# Patient Record
Sex: FEMALE | Race: WHITE | NOT HISPANIC OR LATINO | Employment: FULL TIME | URBAN - METROPOLITAN AREA
[De-identification: names, ages, dates, MRNs, and addresses within clinical notes are randomized per-mention and may not be internally consistent; named-entity substitution may affect disease eponyms.]

---

## 2017-12-13 ENCOUNTER — HISTORICAL (OUTPATIENT)
Dept: RADIOLOGY | Facility: HOSPITAL | Age: 62
End: 2017-12-13

## 2018-03-12 ENCOUNTER — HISTORICAL (OUTPATIENT)
Dept: RADIOLOGY | Facility: HOSPITAL | Age: 63
End: 2018-03-12

## 2018-10-03 ENCOUNTER — HISTORICAL (OUTPATIENT)
Dept: RADIOLOGY | Facility: HOSPITAL | Age: 63
End: 2018-10-03

## 2018-10-10 LAB
HIGH RISK HPV 16 (PRECISION): NEGATIVE
HIGH RISK HPV 18/45 (PRECISION): NEGATIVE
HUMAN PAPILLOMAVIRUS (HPV): NORMAL
PAP RECOMMENDATION EXT: NORMAL
PAP SMEAR: NORMAL

## 2019-05-08 ENCOUNTER — HISTORICAL (OUTPATIENT)
Dept: RADIOLOGY | Facility: HOSPITAL | Age: 64
End: 2019-05-08

## 2019-12-09 ENCOUNTER — HISTORICAL (OUTPATIENT)
Dept: RADIOLOGY | Facility: HOSPITAL | Age: 64
End: 2019-12-09

## 2020-06-22 ENCOUNTER — HISTORICAL (OUTPATIENT)
Dept: RADIOLOGY | Facility: HOSPITAL | Age: 65
End: 2020-06-22

## 2020-12-28 ENCOUNTER — HISTORICAL (OUTPATIENT)
Dept: RADIOLOGY | Facility: HOSPITAL | Age: 65
End: 2020-12-28

## 2021-07-14 LAB — BCS RECOMMENDATION EXT: NORMAL

## 2021-12-29 ENCOUNTER — HISTORICAL (OUTPATIENT)
Dept: RADIOLOGY | Facility: HOSPITAL | Age: 66
End: 2021-12-29

## 2022-03-03 LAB — BCS RECOMMENDATION EXT: NORMAL

## 2022-04-09 ENCOUNTER — HISTORICAL (OUTPATIENT)
Dept: ADMINISTRATIVE | Facility: HOSPITAL | Age: 67
End: 2022-04-09
Payer: COMMERCIAL

## 2022-04-25 VITALS
BODY MASS INDEX: 28.92 KG/M2 | SYSTOLIC BLOOD PRESSURE: 154 MMHG | DIASTOLIC BLOOD PRESSURE: 80 MMHG | HEIGHT: 70 IN | OXYGEN SATURATION: 97 % | WEIGHT: 202 LBS

## 2022-04-30 NOTE — PROGRESS NOTES
Patient:   Tracy Robledo            MRN: [id number]            FIN: [id number]               Age:   65 years     Sex:  Female     :  1955   Associated Diagnoses:   None   Author:   Devon Myers MD A      Pt with history of carcinoid tumor s/p resection in 2018. CT scan done today for follow-up/surveillance. Scan shows no evidence of recurrence. Will repeat CT scan in 1yr and ewill follow up with me then. Discussed with patient via phone.

## 2022-05-03 ENCOUNTER — TELEPHONE (OUTPATIENT)
Dept: HEMATOLOGY/ONCOLOGY | Facility: CLINIC | Age: 67
End: 2022-05-03
Payer: COMMERCIAL

## 2022-05-03 PROBLEM — H26.9 CATARACT OF BOTH EYES: Status: RESOLVED | Noted: 2022-05-03 | Resolved: 2022-05-03

## 2022-05-03 PROBLEM — D3A.00 CARCINOID TUMOR: Status: RESOLVED | Noted: 2022-05-03 | Resolved: 2022-05-03

## 2022-05-03 PROBLEM — D05.11 DUCTAL CARCINOMA IN SITU (DCIS) OF RIGHT BREAST: Status: ACTIVE | Noted: 2022-03-31

## 2022-05-03 PROBLEM — I10 HYPERTENSION: Status: ACTIVE | Noted: 2022-05-03

## 2022-05-03 PROBLEM — D3A.00 CARCINOID TUMOR: Status: ACTIVE | Noted: 2022-05-03

## 2022-05-03 PROBLEM — N13.30 HYDRONEPHROSIS: Status: ACTIVE | Noted: 2022-05-03

## 2022-05-03 PROBLEM — K21.9 GASTROESOPHAGEAL REFLUX DISEASE: Status: ACTIVE | Noted: 2022-05-03

## 2022-05-03 PROBLEM — H26.9 CATARACT OF BOTH EYES: Status: ACTIVE | Noted: 2022-05-03

## 2022-05-03 PROBLEM — N13.30 HYDRONEPHROSIS: Status: RESOLVED | Noted: 2022-05-03 | Resolved: 2022-05-03

## 2022-05-03 PROBLEM — I25.10 ARTERIOSCLEROSIS OF CORONARY ARTERY: Status: ACTIVE | Noted: 2022-05-03

## 2022-05-04 ENCOUNTER — OFFICE VISIT (OUTPATIENT)
Dept: HEMATOLOGY/ONCOLOGY | Facility: CLINIC | Age: 67
End: 2022-05-04
Payer: COMMERCIAL

## 2022-05-04 VITALS
HEIGHT: 68 IN | SYSTOLIC BLOOD PRESSURE: 151 MMHG | WEIGHT: 198.88 LBS | TEMPERATURE: 97 F | BODY MASS INDEX: 30.14 KG/M2 | HEART RATE: 72 BPM | DIASTOLIC BLOOD PRESSURE: 78 MMHG | OXYGEN SATURATION: 98 %

## 2022-05-04 DIAGNOSIS — D05.11 DUCTAL CARCINOMA IN SITU (DCIS) OF RIGHT BREAST: Primary | ICD-10-CM

## 2022-05-04 PROCEDURE — 99999 PR PBB SHADOW E&M-EST. PATIENT-LVL III: ICD-10-PCS | Mod: PBBFAC,,, | Performed by: INTERNAL MEDICINE

## 2022-05-04 PROCEDURE — 3077F PR MOST RECENT SYSTOLIC BLOOD PRESSURE >= 140 MM HG: ICD-10-PCS | Mod: CPTII,S$GLB,, | Performed by: INTERNAL MEDICINE

## 2022-05-04 PROCEDURE — 1101F PR PT FALLS ASSESS DOC 0-1 FALLS W/OUT INJ PAST YR: ICD-10-PCS | Mod: CPTII,S$GLB,, | Performed by: INTERNAL MEDICINE

## 2022-05-04 PROCEDURE — 1159F PR MEDICATION LIST DOCUMENTED IN MEDICAL RECORD: ICD-10-PCS | Mod: CPTII,S$GLB,, | Performed by: INTERNAL MEDICINE

## 2022-05-04 PROCEDURE — 1101F PT FALLS ASSESS-DOCD LE1/YR: CPT | Mod: CPTII,S$GLB,, | Performed by: INTERNAL MEDICINE

## 2022-05-04 PROCEDURE — 99204 OFFICE O/P NEW MOD 45 MIN: CPT | Mod: S$GLB,,, | Performed by: INTERNAL MEDICINE

## 2022-05-04 PROCEDURE — 3078F DIAST BP <80 MM HG: CPT | Mod: CPTII,S$GLB,, | Performed by: INTERNAL MEDICINE

## 2022-05-04 PROCEDURE — 4010F ACE/ARB THERAPY RXD/TAKEN: CPT | Mod: CPTII,S$GLB,, | Performed by: INTERNAL MEDICINE

## 2022-05-04 PROCEDURE — 3078F PR MOST RECENT DIASTOLIC BLOOD PRESSURE < 80 MM HG: ICD-10-PCS | Mod: CPTII,S$GLB,, | Performed by: INTERNAL MEDICINE

## 2022-05-04 PROCEDURE — 3077F SYST BP >= 140 MM HG: CPT | Mod: CPTII,S$GLB,, | Performed by: INTERNAL MEDICINE

## 2022-05-04 PROCEDURE — 3288F FALL RISK ASSESSMENT DOCD: CPT | Mod: CPTII,S$GLB,, | Performed by: INTERNAL MEDICINE

## 2022-05-04 PROCEDURE — 3288F PR FALLS RISK ASSESSMENT DOCUMENTED: ICD-10-PCS | Mod: CPTII,S$GLB,, | Performed by: INTERNAL MEDICINE

## 2022-05-04 PROCEDURE — 1160F PR REVIEW ALL MEDS BY PRESCRIBER/CLIN PHARMACIST DOCUMENTED: ICD-10-PCS | Mod: CPTII,S$GLB,, | Performed by: INTERNAL MEDICINE

## 2022-05-04 PROCEDURE — 4010F PR ACE/ARB THEARPY RXD/TAKEN: ICD-10-PCS | Mod: CPTII,S$GLB,, | Performed by: INTERNAL MEDICINE

## 2022-05-04 PROCEDURE — 1160F RVW MEDS BY RX/DR IN RCRD: CPT | Mod: CPTII,S$GLB,, | Performed by: INTERNAL MEDICINE

## 2022-05-04 PROCEDURE — 99999 PR PBB SHADOW E&M-EST. PATIENT-LVL III: CPT | Mod: PBBFAC,,, | Performed by: INTERNAL MEDICINE

## 2022-05-04 PROCEDURE — 3008F BODY MASS INDEX DOCD: CPT | Mod: CPTII,S$GLB,, | Performed by: INTERNAL MEDICINE

## 2022-05-04 PROCEDURE — 1159F MED LIST DOCD IN RCRD: CPT | Mod: CPTII,S$GLB,, | Performed by: INTERNAL MEDICINE

## 2022-05-04 PROCEDURE — 99204 PR OFFICE/OUTPT VISIT, NEW, LEVL IV, 45-59 MIN: ICD-10-PCS | Mod: S$GLB,,, | Performed by: INTERNAL MEDICINE

## 2022-05-04 PROCEDURE — 3008F PR BODY MASS INDEX (BMI) DOCUMENTED: ICD-10-PCS | Mod: CPTII,S$GLB,, | Performed by: INTERNAL MEDICINE

## 2022-05-04 RX ORDER — AMOXICILLIN 500 MG
CAPSULE ORAL DAILY
COMMUNITY

## 2022-05-04 RX ORDER — ASPIRIN 81 MG/1
TABLET ORAL
COMMUNITY

## 2022-05-04 RX ORDER — ROSUVASTATIN CALCIUM 5 MG/1
5 TABLET, COATED ORAL
COMMUNITY

## 2022-05-04 RX ORDER — ACETAMINOPHEN 500 MG
TABLET ORAL
COMMUNITY
End: 2022-07-13

## 2022-05-04 RX ORDER — METOPROLOL SUCCINATE 100 MG/1
100 TABLET, EXTENDED RELEASE ORAL DAILY
COMMUNITY

## 2022-05-04 RX ORDER — OMEPRAZOLE 40 MG/1
40 CAPSULE, DELAYED RELEASE ORAL
COMMUNITY

## 2022-05-04 NOTE — PROGRESS NOTES
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 There was a 1.4 cm mass in the right breast at the 3 o'clock position 3 cm from the nipple.  She underwent a biopsy.  Which showed intraductal papilloma with atypia.  She was then seen and underwent evaluation by Dr. Mohinder Santillan.  She had a breast MRI on 03/03/2022.  Which showed fibroglandular changes.  No evidence of active malignancy.  But due to her atypia.  The patient underwent an excisional biopsy on 03/31/2022.    The final report of that breast excisional biopsy on 04/06/2022 showed a low-grade ductal carcinoma in Situ with focal papillary micro papillary features with postinflammatory change.  Lesion was noted to be within several mm of the skeletal muscle tissue consistent with a deep location within the breast.        05/04/2022:  New patient visit.    05/04/2022  Past Medical History:   Diagnosis Date    Carcinoid tumor 10/29/2018    Cataract of both eyes 05/03/2022    Hydronephrosis 05/03/2022        Current Outpatient Medications:     aspirin (ECOTRIN) 81 MG EC tablet, Aspirin Low Dose Take No date recorded No form recorded No frequency recorded No route recorded No set duration recorded No set duration amount recorded active No dosage strength recorded No dosage strength units of measure recorded, Disp: , Rfl:     CALCIUM ORAL, Take by mouth., Disp: , Rfl:     cholecalciferol, vitamin D3, (VITAMIN D3) 50 mcg (2,000 unit) Cap capsule, Take by mouth., Disp: , Rfl:     metoprolol succinate (TOPROL-XL) 100 MG 24 hr tablet, Take 100 mg by mouth once daily., Disp: , Rfl:     montelukast sodium (SINGULAIR ORAL), Singulair Take No date recorded No  form recorded No frequency recorded No route recorded No set duration recorded No set duration amount recorded active No dosage strength recorded No dosage strength units of measure recorded, Disp: , Rfl:     olmesartan medoxomil (BENICAR ORAL), Benicar Take No date recorded No form recorded No frequency recorded No route recorded No set duration recorded No set duration amount recorded active No dosage strength recorded No dosage strength units of measure recorded, Disp: , Rfl:     omega-3 fatty acids/fish oil (FISH OIL-OMEGA-3 FATTY ACIDS) 300-1,000 mg capsule, Take by mouth once daily., Disp: , Rfl:     omeprazole (PRILOSEC) 40 MG capsule, Take 40 mg by mouth., Disp: , Rfl:     rosuvastatin (CRESTOR) 5 MG tablet, Take 5 mg by mouth., Disp: , Rfl:     ZINC ACETATE ORAL, Take by mouth., Disp: , Rfl:      Review of Systems   Review of systems: Constitutional:  [No fever, fatigue or weight loss.  No chills, no night sweats.  No weakness]    Eyes:  [No recent vision changes,]    ENT:  [No congestion, ear pain, or sore throat. no ringing in ears. No Dry mouth. no epistaxis .  No dental problems.  No epistaxis]    Endocrine:  [No thyroid problems. ]    Cardiovascular:  [No chest pain, Palpitations,  PND, orthopnea.  Edema]    Respiratory:  [No cough, shortness of breath, congestion, or wheezing.  No hemoptysis]    Gastrointestinal:  [No abdominal pain, nausea, vomiting, or diarrhea. No jaundice  NO Black tarry stool].  No heartburn.  No anorexia.    Genitourinary: [No dysuria.  No frequency.]    Musculoskeletal:  [No joint swelling.  No joint pain.]    Neurologic:  [No seizures.  Positive carpal tunnel No Headaches. No loss of balance. no new weakness.  No headaches.  No tremors]   Skin:  [No rash.  No new skin lesions. no excess dryness]    Hematologic:  [No unusual bruising or bleeding.]    Psychiatric:  [No psychiatric problems, hallucinations or depression.]    [All other systems reviewed and otherwise  negative.]          Objective:     Vitals:    05/04/22 1333   BP: (!) 151/78   Pulse: 72   Temp: 97.4 °F (36.3 °C)         Physical Exam    Normal Physical exam:  VITAL SIGNS:  Reviewed.  [  ]  GENERAL: [Well-developed well-nourished in no apparent cardiorespiratory distress]    HEAD: Normocephalic, atraumatic  EYES:  Pupils are equal.   reactive to light.  Extraocular motions intact.  No scleral icterus.  No pallor.  EARS:  Hearing grossly intact.  MOUTH:  Oropharynx is clear without exudates or erythema.   NECK: Supple no adenopathy, no JVD.  Trachea midline  CHEST:  Chest with clear breath sounds bilaterally.  No wheezes, rales, or rhonchi.    CARDIAC:  Regular rate and rhythm.  S1 and S2, without murmurs, gallops, or rubs.  VASCULAR:  No Edema.  Peripheral pulses normal and equal in all extremities.  ABDOMEN:  Soft, without detectable tenderness.  No sign of distention.  No   rebound or guarding, and no masses palpated.   Bowel Sounds normal.  MUSCULOSKELETAL:  Good range of motion of all major joints. Extremities without clubbing, cyanosis or edema.    NEUROLOGIC EXAM:  Alert and oriented x 3.  No focal sensory or strength deficits.   Speech normal.  Follows commands.  PSYCHIATRIC:  Mood normal.  Chaperone present:  Bilateral breast exam shows fibrocystic breasts, there is a well-healed surgical scar      Assessment:       Problem List Items Addressed This Visit        Oncology    Ductal carcinoma in situ (DCIS) of right breast        NCCN guidelines discussed  Stage 0 discussed  Avoidance of estrogen type therapy and natural hormones discussed  Risk of relapse recurrence  Adjuvant preventative therapy  Endocrine Therapy .  The side effects of anti-hormonal therapy were discussed.  Including but not limited to: Vasomotor hot flashes, dysuria, arthralgia, hyperlipidemia, osteopenia, osteoporosis. Hand outs given on medications and patient had the opportunity to ask questions.        Plan:     CBC, CMP, vitamin-D  on follow-up  Baseline nuclear medicine bone density, obtain outside report she had done by her primary care  Return to clinic in 6 weeks with the above labs and reports  Request breast panel on biopsy        Med Onc Chart Routing      Follow up with physician 6 weeks.   Follow up with [facility]    Labs CBC, CMP and vitamin D   Lab interval:  Request breast panel on last biopsy   Imaging    Results of outside bone density   Pharmacy appointment    Other referrals

## 2022-06-13 NOTE — PROGRESS NOTES
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 There was a 1.4 cm mass in the right breast at the 3 o'clock position 3 cm from the nipple.  She underwent a biopsy.  Which showed intraductal papilloma with atypia.  She was then seen and underwent evaluation by Dr. Mohinder Santillan.  She had a breast MRI on 03/03/2022.  Which showed fibroglandular changes.  No evidence of active malignancy.  But due to her atypia.  The patient underwent an excisional biopsy on 03/31/2022.    The final report of that breast excisional biopsy on 04/06/2022 showed a low-grade ductal carcinoma in Situ with focal papillary micro papillary features with postinflammatory change.  Lesion was noted to be within several mm of the skeletal muscle tissue consistent with a deep location within the breast.        The patient is here today for follow-up.  Since her last visit she has completed radiation therapy.  She denies any breast pain or tenderness.  She does have some skin changes.  We did not do a formal breast exam today due her just completing radiation last week.      06/14/2022  Past Medical History:   Diagnosis Date    Carcinoid tumor 10/29/2018    Cataract of both eyes 05/03/2022    Hydronephrosis 05/03/2022        Current Outpatient Medications:     aspirin (ECOTRIN) 81 MG EC tablet, Aspirin Low Dose Take No date recorded No form recorded No frequency recorded No route recorded No set duration recorded No set duration amount recorded active No dosage strength recorded No dosage strength units of measure recorded, Disp: , Rfl:     CALCIUM ORAL, Take by mouth., Disp: , Rfl:     cholecalciferol, vitamin D3, (VITAMIN D3) 50  mcg (2,000 unit) Cap capsule, Take by mouth., Disp: , Rfl:     metoprolol succinate (TOPROL-XL) 100 MG 24 hr tablet, Take 100 mg by mouth once daily., Disp: , Rfl:     montelukast sodium (SINGULAIR ORAL), Singulair Take No date recorded No form recorded No frequency recorded No route recorded No set duration recorded No set duration amount recorded active No dosage strength recorded No dosage strength units of measure recorded, Disp: , Rfl:     olmesartan medoxomil (BENICAR ORAL), Benicar Take No date recorded No form recorded No frequency recorded No route recorded No set duration recorded No set duration amount recorded active No dosage strength recorded No dosage strength units of measure recorded, Disp: , Rfl:     omega-3 fatty acids/fish oil (FISH OIL-OMEGA-3 FATTY ACIDS) 300-1,000 mg capsule, Take by mouth once daily., Disp: , Rfl:     omeprazole (PRILOSEC) 40 MG capsule, Take 40 mg by mouth., Disp: , Rfl:     rosuvastatin (CRESTOR) 5 MG tablet, Take 5 mg by mouth., Disp: , Rfl:      Review of Systems   Constitutional: Negative for appetite change and unexpected weight change.   HENT: Negative for mouth sores.    Eyes: Negative for visual disturbance.   Respiratory: Negative for cough and shortness of breath.    Cardiovascular: Negative for chest pain.   Gastrointestinal: Negative for abdominal pain and diarrhea.   Genitourinary: Negative for frequency.   Musculoskeletal: Negative for back pain.   Integumentary:  Negative for rash.   Neurological: Negative for headaches.   Hematological: Negative for adenopathy.   Psychiatric/Behavioral: The patient is not nervous/anxious.             Objective:     Vitals:    06/14/22 1449   BP: 132/78   Pulse: 79   Temp: 97.5 °F (36.4 °C)         Physical Exam    Normal Physical exam:  VITAL SIGNS:  Reviewed.  [  ]  GENERAL: [Well-developed well-nourished in no apparent cardiorespiratory distress]    HEAD: Normocephalic, atraumatic  EYES:  Pupils are equal.   reactive  to light.  Extraocular motions intact.  No scleral icterus.  No pallor.  EARS:  Hearing grossly intact.  MOUTH:  Oropharynx is clear without exudates or erythema.   NECK: Supple no adenopathy, no JVD.  Trachea midline  CHEST:  Chest with clear breath sounds bilaterally.  No wheezes, rales, or rhonchi.    CARDIAC:  Regular rate and rhythm.  S1 and S2, without murmurs, gallops, or rubs.  VASCULAR:  No Edema.  Peripheral pulses normal and equal in all extremities.  ABDOMEN:  Soft, without detectable tenderness.  No sign of distention.  No   rebound or guarding, and no masses palpated.   Bowel Sounds normal.  MUSCULOSKELETAL:  Good range of motion of all major joints. Extremities without clubbing, cyanosis or edema.    NEUROLOGIC EXAM:  Alert and oriented x 3.  No focal sensory or strength deficits.   Speech normal.  Follows commands.  PSYCHIATRIC:  Mood normal.  Chaperone present:  Bilateral breast exam shows fibrocystic breasts, there is a well-healed surgical scar      Assessment:           NCCN guidelines discussed  Stage 0 discussed  Avoidance of estrogen type therapy and natural hormones discussed  Risk of relapse recurrence  Adjuvant preventative therapy  Endocrine Therapy .  The side effects of anti-hormonal therapy were discussed.  Including but not limited to: Vasomotor hot flashes, dysuria, arthralgia, hyperlipidemia, osteopenia, osteoporosis. Hand outs given on medications and patient had the opportunity to ask questions.  Importance of exercise and weight loss.  The patient is concerned regarding her history of atherosclerotic disease and coronary stent.    Problem List Items Addressed This Visit        Oncology    Ductal carcinoma in situ (DCIS) of right breast - Primary    Current Assessment & Plan     TeleMed follow-up in 4 weeks  If repeat ER TX is negative or indeterminate, then this patient will not take endocrine therapy due to her cardiovascular risk and the fact that she has a stent.  If her ER TX  is positive, then we will rediscuss the pros and cons of the above again.                       Plan:             Med Onc Chart Routing      Follow up with physician 4 weeks. Telemed   Follow up with [facility]    Labs    Lab interval:  Request breast panel on last biopsy   Imaging    Results of outside bone density   Pharmacy appointment    Other referrals

## 2022-06-14 ENCOUNTER — LAB VISIT (OUTPATIENT)
Dept: LAB | Facility: HOSPITAL | Age: 67
End: 2022-06-14
Attending: INTERNAL MEDICINE
Payer: COMMERCIAL

## 2022-06-14 ENCOUNTER — OFFICE VISIT (OUTPATIENT)
Dept: HEMATOLOGY/ONCOLOGY | Facility: CLINIC | Age: 67
End: 2022-06-14
Payer: COMMERCIAL

## 2022-06-14 VITALS
HEART RATE: 79 BPM | SYSTOLIC BLOOD PRESSURE: 132 MMHG | OXYGEN SATURATION: 98 % | DIASTOLIC BLOOD PRESSURE: 78 MMHG | BODY MASS INDEX: 28.99 KG/M2 | TEMPERATURE: 98 F | WEIGHT: 195.75 LBS | HEIGHT: 69 IN

## 2022-06-14 DIAGNOSIS — D05.11 DUCTAL CARCINOMA IN SITU (DCIS) OF RIGHT BREAST: Primary | ICD-10-CM

## 2022-06-14 DIAGNOSIS — D05.11 DUCTAL CARCINOMA IN SITU (DCIS) OF RIGHT BREAST: ICD-10-CM

## 2022-06-14 LAB
ALBUMIN SERPL-MCNC: 4.1 GM/DL (ref 3.4–4.8)
ALBUMIN/GLOB SERPL: 1.4 RATIO (ref 1.1–2)
ALP SERPL-CCNC: 73 UNIT/L (ref 40–150)
ALT SERPL-CCNC: 22 UNIT/L (ref 0–55)
AST SERPL-CCNC: 22 UNIT/L (ref 5–34)
BASOPHILS # BLD AUTO: 0.04 X10(3)/MCL (ref 0–0.2)
BASOPHILS NFR BLD AUTO: 0.8 %
BILIRUBIN DIRECT+TOT PNL SERPL-MCNC: 0.8 MG/DL
BUN SERPL-MCNC: 14.2 MG/DL (ref 9.8–20.1)
CALCIUM SERPL-MCNC: 10.1 MG/DL (ref 8.4–10.2)
CHLORIDE SERPL-SCNC: 107 MMOL/L (ref 98–107)
CO2 SERPL-SCNC: 28 MMOL/L (ref 23–31)
CREAT SERPL-MCNC: 0.82 MG/DL (ref 0.55–1.02)
DEPRECATED CALCIDIOL+CALCIFEROL SERPL-MC: 93.3 NG/ML (ref 30–80)
EOSINOPHIL # BLD AUTO: 0.23 X10(3)/MCL (ref 0–0.9)
EOSINOPHIL NFR BLD AUTO: 4.5 %
ERYTHROCYTE [DISTWIDTH] IN BLOOD BY AUTOMATED COUNT: 13.4 % (ref 11.5–17)
GLOBULIN SER-MCNC: 2.9 GM/DL (ref 2.4–3.5)
GLUCOSE SERPL-MCNC: 95 MG/DL (ref 82–115)
HCT VFR BLD AUTO: 38.3 % (ref 37–47)
HGB BLD-MCNC: 12.6 GM/DL (ref 12–16)
IMM GRANULOCYTES # BLD AUTO: 0.01 X10(3)/MCL (ref 0–0.02)
IMM GRANULOCYTES NFR BLD AUTO: 0.2 % (ref 0–0.43)
LYMPHOCYTES # BLD AUTO: 1.2 X10(3)/MCL (ref 0.6–4.6)
LYMPHOCYTES NFR BLD AUTO: 23.7 %
MCH RBC QN AUTO: 30.3 PG (ref 27–31)
MCHC RBC AUTO-ENTMCNC: 32.9 MG/DL (ref 33–36)
MCV RBC AUTO: 92.1 FL (ref 80–94)
MONOCYTES # BLD AUTO: 0.46 X10(3)/MCL (ref 0.1–1.3)
MONOCYTES NFR BLD AUTO: 9.1 %
NEUTROPHILS # BLD AUTO: 3.1 X10(3)/MCL (ref 2.1–9.2)
NEUTROPHILS NFR BLD AUTO: 61.7 %
PLATELET # BLD AUTO: 192 X10(3)/MCL (ref 130–400)
PMV BLD AUTO: 9.9 FL (ref 9.4–12.4)
POTASSIUM SERPL-SCNC: 4.3 MMOL/L (ref 3.5–5.1)
PROT SERPL-MCNC: 7 GM/DL (ref 5.8–7.6)
RBC # BLD AUTO: 4.16 X10(6)/MCL (ref 4.2–5.4)
SODIUM SERPL-SCNC: 143 MMOL/L (ref 136–145)
WBC # SPEC AUTO: 5.1 X10(3)/MCL (ref 4.5–11.5)

## 2022-06-14 PROCEDURE — 99214 PR OFFICE/OUTPT VISIT, EST, LEVL IV, 30-39 MIN: ICD-10-PCS | Mod: S$GLB,,, | Performed by: INTERNAL MEDICINE

## 2022-06-14 PROCEDURE — 99999 PR PBB SHADOW E&M-EST. PATIENT-LVL IV: ICD-10-PCS | Mod: PBBFAC,,, | Performed by: INTERNAL MEDICINE

## 2022-06-14 PROCEDURE — 3078F DIAST BP <80 MM HG: CPT | Mod: CPTII,S$GLB,, | Performed by: INTERNAL MEDICINE

## 2022-06-14 PROCEDURE — 82306 VITAMIN D 25 HYDROXY: CPT

## 2022-06-14 PROCEDURE — 4010F ACE/ARB THERAPY RXD/TAKEN: CPT | Mod: CPTII,S$GLB,, | Performed by: INTERNAL MEDICINE

## 2022-06-14 PROCEDURE — 4010F PR ACE/ARB THEARPY RXD/TAKEN: ICD-10-PCS | Mod: CPTII,S$GLB,, | Performed by: INTERNAL MEDICINE

## 2022-06-14 PROCEDURE — 3008F BODY MASS INDEX DOCD: CPT | Mod: CPTII,S$GLB,, | Performed by: INTERNAL MEDICINE

## 2022-06-14 PROCEDURE — 1159F MED LIST DOCD IN RCRD: CPT | Mod: CPTII,S$GLB,, | Performed by: INTERNAL MEDICINE

## 2022-06-14 PROCEDURE — 1160F PR REVIEW ALL MEDS BY PRESCRIBER/CLIN PHARMACIST DOCUMENTED: ICD-10-PCS | Mod: CPTII,S$GLB,, | Performed by: INTERNAL MEDICINE

## 2022-06-14 PROCEDURE — 3008F PR BODY MASS INDEX (BMI) DOCUMENTED: ICD-10-PCS | Mod: CPTII,S$GLB,, | Performed by: INTERNAL MEDICINE

## 2022-06-14 PROCEDURE — 1160F RVW MEDS BY RX/DR IN RCRD: CPT | Mod: CPTII,S$GLB,, | Performed by: INTERNAL MEDICINE

## 2022-06-14 PROCEDURE — 3075F PR MOST RECENT SYSTOLIC BLOOD PRESS GE 130-139MM HG: ICD-10-PCS | Mod: CPTII,S$GLB,, | Performed by: INTERNAL MEDICINE

## 2022-06-14 PROCEDURE — 3075F SYST BP GE 130 - 139MM HG: CPT | Mod: CPTII,S$GLB,, | Performed by: INTERNAL MEDICINE

## 2022-06-14 PROCEDURE — 99214 OFFICE O/P EST MOD 30 MIN: CPT | Mod: S$GLB,,, | Performed by: INTERNAL MEDICINE

## 2022-06-14 PROCEDURE — 36415 COLL VENOUS BLD VENIPUNCTURE: CPT

## 2022-06-14 PROCEDURE — 99999 PR PBB SHADOW E&M-EST. PATIENT-LVL IV: CPT | Mod: PBBFAC,,, | Performed by: INTERNAL MEDICINE

## 2022-06-14 PROCEDURE — 85025 COMPLETE CBC W/AUTO DIFF WBC: CPT

## 2022-06-14 PROCEDURE — 80053 COMPREHEN METABOLIC PANEL: CPT

## 2022-06-14 PROCEDURE — 1159F PR MEDICATION LIST DOCUMENTED IN MEDICAL RECORD: ICD-10-PCS | Mod: CPTII,S$GLB,, | Performed by: INTERNAL MEDICINE

## 2022-06-14 PROCEDURE — 3078F PR MOST RECENT DIASTOLIC BLOOD PRESSURE < 80 MM HG: ICD-10-PCS | Mod: CPTII,S$GLB,, | Performed by: INTERNAL MEDICINE

## 2022-06-14 NOTE — ASSESSMENT & PLAN NOTE
TeleMed follow-up in 4 weeks  If repeat ER NJ is negative or indeterminate, then this patient will not take endocrine therapy due to her cardiovascular risk and the fact that she has a stent.  If her ER NJ is positive, then we will rediscuss the pros and cons of the above again.

## 2022-07-12 NOTE — ASSESSMENT & PLAN NOTE
Is a 66-year-old postmenopausal female with a low-grade ductal carcinoma in Situ.  She is status post a lumpectomy, and radiation therapy.  She has a history of coronary artery disease with an LAD stent on aspirin.  Has a strong family history of DVT and blood clots.  She has osteopenia on her most recent bone density testing.  We had a long discussion regarding use of adjuvant tamoxifen or aromatase inhibitors in the prevention of DCIS.  She has low-grade disease.  We discussed her risk of relapse can be as high as 10% with a 50% reduction in risk given 1 of the above measures.  However she has a 2-3 fold increased risk DVT.  And with aromatase inhibitors has an increased cardiovascular risk and worsening osteopenia risk.In addition we discussed the potential risk of cataract formation with tamoxifen.  We discussed the discrepancy and data of cardiovascular protection with tamoxifen.      At this point time the patient does not want to proceed with more aggressive preventative therapy.  Given her family history of DVT, given her personal history of coronary artery disease and osteopenia, the patient has made an informed decision regarding the above.    Recommendations  Continue screening mammography:  Discussed with Dr. Salmeron MRI screening are ultrasound additions,  Continue bone density follow-up with primary care  Encourage weight loss, weight-bearing exercise.  Lower body fat higher muscle mass  Discharged from clinic  Re consult if needed

## 2022-07-12 NOTE — PROGRESS NOTES
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 There was a 1.4 cm mass in the right breast at the 3 o'clock position 3 cm from the nipple.  She underwent a biopsy.  Which showed intraductal papilloma with atypia.  She was then seen and underwent evaluation by Dr. Mohinder Santillan.  She had a breast MRI on 03/03/2022.  Which showed fibroglandular changes.  No evidence of active malignancy.  But due to her atypia.  The patient underwent an excisional biopsy on 03/31/2022.    The final report of that breast excisional biopsy on 04/06/2022 showed a low-grade ductal carcinoma in Situ with focal papillary micro papillary features with postinflammatory change.  Lesion was noted to be within several mm of the skeletal muscle tissue consistent with a deep location within the breast.        The patient is here today for follow-up.      07/13/2022  Past Medical History:   Diagnosis Date    Carcinoid tumor 10/29/2018    Cataract of both eyes 05/03/2022    Hydronephrosis 05/03/2022        Current Outpatient Medications:     aspirin (ECOTRIN) 81 MG EC tablet, Aspirin Low Dose Take No date recorded No form recorded No frequency recorded No route recorded No set duration recorded No set duration amount recorded active No dosage strength recorded No dosage strength units of measure recorded, Disp: , Rfl:     CALCIUM ORAL, Take by mouth., Disp: , Rfl:     metoprolol succinate (TOPROL-XL) 100 MG 24 hr tablet, Take 100 mg by mouth once daily., Disp: , Rfl:     montelukast sodium (SINGULAIR ORAL), Singulair Take No date recorded No form recorded No frequency recorded No route recorded No set duration recorded No set duration amount  recorded active No dosage strength recorded No dosage strength units of measure recorded, Disp: , Rfl:     olmesartan medoxomil (BENICAR ORAL), Benicar Take No date recorded No form recorded No frequency recorded No route recorded No set duration recorded No set duration amount recorded active No dosage strength recorded No dosage strength units of measure recorded, Disp: , Rfl:     omega-3 fatty acids/fish oil (FISH OIL-OMEGA-3 FATTY ACIDS) 300-1,000 mg capsule, Take by mouth once daily., Disp: , Rfl:     omeprazole (PRILOSEC) 40 MG capsule, Take 40 mg by mouth., Disp: , Rfl:     rosuvastatin (CRESTOR) 5 MG tablet, Take 5 mg by mouth., Disp: , Rfl:      Review of Systems   Constitutional: Negative for appetite change and unexpected weight change.   HENT: Negative for mouth sores.    Eyes: Negative for visual disturbance.   Respiratory: Negative for cough and shortness of breath.    Cardiovascular: Negative for chest pain.   Gastrointestinal: Negative for abdominal pain and diarrhea.   Genitourinary: Negative for frequency.   Musculoskeletal: Negative for back pain.   Integumentary:  Negative for rash.   Neurological: Negative for headaches.   Hematological: Negative for adenopathy.   Psychiatric/Behavioral: The patient is not nervous/anxious.             Objective:     Vitals:    07/13/22 1517   BP: (!) 173/90   Pulse: 68   Temp: 97.9 °F (36.6 °C)         Physical Exam    Normal Physical exam:  VITAL SIGNS:  Reviewed.  [  ]  GENERAL: [Well-developed well-nourished in no apparent cardiorespiratory distress]    HEAD: Normocephalic, atraumatic  EYES:  Pupils are equal.   reactive to light.  Extraocular motions intact.  No scleral icterus.  No pallor.  EARS:  Hearing grossly intact.  MOUTH:  Oropharynx is clear without exudates or erythema.   NECK: Supple no adenopathy, no JVD.  Trachea midline  CHEST:  Chest with clear breath sounds bilaterally.  No wheezes, rales, or rhonchi.    CARDIAC:  Regular rate and rhythm.   S1 and S2, without murmurs, gallops, or rubs.  VASCULAR:  No Edema.  Peripheral pulses normal and equal in all extremities.  ABDOMEN:  Soft, without detectable tenderness.  No sign of distention.  No   rebound or guarding, and no masses palpated.   Bowel Sounds normal.  MUSCULOSKELETAL:  Good range of motion of all major joints. Extremities without clubbing, cyanosis or edema.    NEUROLOGIC EXAM:  Alert and oriented x 3.  No focal sensory or strength deficits.   Speech normal.  Follows commands.  PSYCHIATRIC:  Mood normal.  Chaperone present:  Bilateral breast exam shows fibrocystic breasts, there is a well-healed surgical scar      Assessment:           NCCN guidelines discussed  Stage 0 discussed  Avoidance of estrogen type therapy and natural hormones discussed  Risk of relapse recurrence  Adjuvant preventative therapy  Endocrine Therapy .  The side effects of anti-hormonal therapy were discussed.  Including but not limited to: Vasomotor hot flashes, dysuria, arthralgia, hyperlipidemia, osteopenia, osteoporosis. Hand outs given on medications and patient had the opportunity to ask questions.  Importance of exercise and weight loss.  The patient is concerned regarding her history of atherosclerotic disease and coronary stent.    Problem List Items Addressed This Visit        Oncology    Ductal carcinoma in situ (DCIS) of right breast - Primary    Current Assessment & Plan     Is a 66-year-old postmenopausal female with a low-grade ductal carcinoma in Situ.  She is status post a lumpectomy, and radiation therapy.  She has a history of coronary artery disease with an LAD stent on aspirin.  Has a strong family history of DVT and blood clots.  She has osteopenia on her most recent bone density testing.  We had a long discussion regarding use of adjuvant tamoxifen or aromatase inhibitors in the prevention of DCIS.  She has low-grade disease.  We discussed her risk of relapse can be as high as 10% with a 50% reduction  in risk given 1 of the above measures.  However she has a 2-3 fold increased risk DVT.  And with aromatase inhibitors has an increased cardiovascular risk and worsening osteopenia risk.In addition we discussed the potential risk of cataract formation with tamoxifen.  We discussed the discrepancy and data of cardiovascular protection with tamoxifen.      At this point time the patient does not want to proceed with more aggressive preventative therapy.  Given her family history of DVT, given her personal history of coronary artery disease and osteopenia, the patient has made an informed decision regarding the above.    Recommendations  Continue screening mammography:  Discussed with Dr. Salmeron MRI screening are ultrasound additions,  Continue bone density follow-up with primary care  Encourage weight loss, weight-bearing exercise.  Lower body fat higher muscle mass  Discharged from clinic  Re consult if needed

## 2022-07-13 ENCOUNTER — OFFICE VISIT (OUTPATIENT)
Dept: HEMATOLOGY/ONCOLOGY | Facility: CLINIC | Age: 67
End: 2022-07-13
Payer: COMMERCIAL

## 2022-07-13 VITALS
DIASTOLIC BLOOD PRESSURE: 90 MMHG | SYSTOLIC BLOOD PRESSURE: 173 MMHG | HEIGHT: 67 IN | TEMPERATURE: 98 F | WEIGHT: 187.81 LBS | BODY MASS INDEX: 29.48 KG/M2 | OXYGEN SATURATION: 99 % | HEART RATE: 68 BPM

## 2022-07-13 DIAGNOSIS — D05.11 DUCTAL CARCINOMA IN SITU (DCIS) OF RIGHT BREAST: Primary | ICD-10-CM

## 2022-07-13 PROCEDURE — 99214 PR OFFICE/OUTPT VISIT, EST, LEVL IV, 30-39 MIN: ICD-10-PCS | Mod: S$GLB,,, | Performed by: INTERNAL MEDICINE

## 2022-07-13 PROCEDURE — 3077F SYST BP >= 140 MM HG: CPT | Mod: CPTII,S$GLB,, | Performed by: INTERNAL MEDICINE

## 2022-07-13 PROCEDURE — 4010F ACE/ARB THERAPY RXD/TAKEN: CPT | Mod: CPTII,S$GLB,, | Performed by: INTERNAL MEDICINE

## 2022-07-13 PROCEDURE — 1160F RVW MEDS BY RX/DR IN RCRD: CPT | Mod: CPTII,S$GLB,, | Performed by: INTERNAL MEDICINE

## 2022-07-13 PROCEDURE — 99999 PR PBB SHADOW E&M-EST. PATIENT-LVL III: CPT | Mod: PBBFAC,,, | Performed by: INTERNAL MEDICINE

## 2022-07-13 PROCEDURE — 99214 OFFICE O/P EST MOD 30 MIN: CPT | Mod: S$GLB,,, | Performed by: INTERNAL MEDICINE

## 2022-07-13 PROCEDURE — 3080F DIAST BP >= 90 MM HG: CPT | Mod: CPTII,S$GLB,, | Performed by: INTERNAL MEDICINE

## 2022-07-13 PROCEDURE — 99999 PR PBB SHADOW E&M-EST. PATIENT-LVL III: ICD-10-PCS | Mod: PBBFAC,,, | Performed by: INTERNAL MEDICINE

## 2022-07-13 PROCEDURE — 3008F BODY MASS INDEX DOCD: CPT | Mod: CPTII,S$GLB,, | Performed by: INTERNAL MEDICINE

## 2022-07-13 PROCEDURE — 3080F PR MOST RECENT DIASTOLIC BLOOD PRESSURE >= 90 MM HG: ICD-10-PCS | Mod: CPTII,S$GLB,, | Performed by: INTERNAL MEDICINE

## 2022-07-13 PROCEDURE — 3288F PR FALLS RISK ASSESSMENT DOCUMENTED: ICD-10-PCS | Mod: CPTII,S$GLB,, | Performed by: INTERNAL MEDICINE

## 2022-07-13 PROCEDURE — 1160F PR REVIEW ALL MEDS BY PRESCRIBER/CLIN PHARMACIST DOCUMENTED: ICD-10-PCS | Mod: CPTII,S$GLB,, | Performed by: INTERNAL MEDICINE

## 2022-07-13 PROCEDURE — 1101F PR PT FALLS ASSESS DOC 0-1 FALLS W/OUT INJ PAST YR: ICD-10-PCS | Mod: CPTII,S$GLB,, | Performed by: INTERNAL MEDICINE

## 2022-07-13 PROCEDURE — 3288F FALL RISK ASSESSMENT DOCD: CPT | Mod: CPTII,S$GLB,, | Performed by: INTERNAL MEDICINE

## 2022-07-13 PROCEDURE — 1159F PR MEDICATION LIST DOCUMENTED IN MEDICAL RECORD: ICD-10-PCS | Mod: CPTII,S$GLB,, | Performed by: INTERNAL MEDICINE

## 2022-07-13 PROCEDURE — 1159F MED LIST DOCD IN RCRD: CPT | Mod: CPTII,S$GLB,, | Performed by: INTERNAL MEDICINE

## 2022-07-13 PROCEDURE — 4010F PR ACE/ARB THEARPY RXD/TAKEN: ICD-10-PCS | Mod: CPTII,S$GLB,, | Performed by: INTERNAL MEDICINE

## 2022-07-13 PROCEDURE — 3077F PR MOST RECENT SYSTOLIC BLOOD PRESSURE >= 140 MM HG: ICD-10-PCS | Mod: CPTII,S$GLB,, | Performed by: INTERNAL MEDICINE

## 2022-07-13 PROCEDURE — 1101F PT FALLS ASSESS-DOCD LE1/YR: CPT | Mod: CPTII,S$GLB,, | Performed by: INTERNAL MEDICINE

## 2022-07-13 PROCEDURE — 3008F PR BODY MASS INDEX (BMI) DOCUMENTED: ICD-10-PCS | Mod: CPTII,S$GLB,, | Performed by: INTERNAL MEDICINE

## 2022-12-09 ENCOUNTER — DOCUMENTATION ONLY (OUTPATIENT)
Dept: INTERNAL MEDICINE | Facility: CLINIC | Age: 67
End: 2022-12-09
Payer: COMMERCIAL

## 2022-12-14 ENCOUNTER — DOCUMENTATION ONLY (OUTPATIENT)
Dept: INTERNAL MEDICINE | Facility: CLINIC | Age: 67
End: 2022-12-14
Payer: COMMERCIAL

## 2022-12-15 ENCOUNTER — DOCUMENTATION ONLY (OUTPATIENT)
Dept: INTERNAL MEDICINE | Facility: CLINIC | Age: 67
End: 2022-12-15
Payer: COMMERCIAL

## 2023-09-05 DIAGNOSIS — N13.30 HYDRONEPHROSIS: Primary | ICD-10-CM

## 2023-09-20 ENCOUNTER — HOSPITAL ENCOUNTER (OUTPATIENT)
Dept: RADIOLOGY | Facility: HOSPITAL | Age: 68
Discharge: HOME | End: 2023-09-20
Attending: UROLOGY
Payer: COMMERCIAL

## 2023-09-20 DIAGNOSIS — N13.30 HYDRONEPHROSIS: ICD-10-CM

## 2023-09-20 PROCEDURE — 78708 K FLOW/FUNCT IMAGE W/DRUG: CPT | Mod: TC

## 2023-09-20 RX ORDER — FUROSEMIDE 10 MG/ML
40 INJECTION INTRAMUSCULAR; INTRAVENOUS ONCE
Status: DISCONTINUED | OUTPATIENT
Start: 2023-09-20 | End: 2023-09-21 | Stop reason: HOSPADM

## 2024-01-03 ENCOUNTER — TELEPHONE (OUTPATIENT)
Dept: PULMONOLOGY | Facility: HOSPITAL | Age: 69
End: 2024-01-03
Payer: COMMERCIAL

## 2024-01-03 NOTE — TELEPHONE ENCOUNTER
Call patient let her know the results of her most recent CT of chest- no evidence of recurrent or metastatic disease.  It has been 5 years post surgery therefore she can now do yearly low-dose screening.

## 2024-09-10 DIAGNOSIS — Q62.11 CONGENITAL OCCLUSION OF URETEROPELVIC JUNCTION: Primary | ICD-10-CM

## 2024-09-25 ENCOUNTER — HOSPITAL ENCOUNTER (OUTPATIENT)
Dept: RADIOLOGY | Facility: HOSPITAL | Age: 69
Discharge: HOME | End: 2024-09-25
Attending: UROLOGY
Payer: MEDICARE

## 2024-09-25 VITALS — WEIGHT: 195 LBS | BODY MASS INDEX: 30.54 KG/M2

## 2024-09-25 DIAGNOSIS — Q62.11 CONGENITAL OCCLUSION OF URETEROPELVIC JUNCTION: ICD-10-CM

## 2024-09-25 PROCEDURE — A9562 TC99M MERTIATIDE: HCPCS | Performed by: UROLOGY

## 2024-09-25 PROCEDURE — 78708 K FLOW/FUNCT IMAGE W/DRUG: CPT | Mod: TC

## 2024-09-25 PROCEDURE — 63600175 PHARM REV CODE 636 W HCPCS: Performed by: UROLOGY

## 2024-09-25 RX ORDER — FUROSEMIDE 10 MG/ML
40 INJECTION INTRAMUSCULAR; INTRAVENOUS ONCE
Status: COMPLETED | OUTPATIENT
Start: 2024-09-25 | End: 2024-09-25

## 2024-09-25 RX ORDER — BETIATIDE 1 MG/1
8.6 INJECTION, POWDER, LYOPHILIZED, FOR SOLUTION INTRAVENOUS
Status: COMPLETED | OUTPATIENT
Start: 2024-09-25 | End: 2024-09-25

## 2024-09-25 RX ADMIN — FUROSEMIDE 40 MG: 10 INJECTION, SOLUTION INTRAMUSCULAR; INTRAVENOUS at 07:09

## 2024-09-25 RX ADMIN — BETIATIDE 8.6 MILLICURIE: 1 INJECTION, POWDER, LYOPHILIZED, FOR SOLUTION INTRAVENOUS at 07:09
